# Patient Record
Sex: MALE | Race: WHITE | NOT HISPANIC OR LATINO | Employment: OTHER | URBAN - METROPOLITAN AREA
[De-identification: names, ages, dates, MRNs, and addresses within clinical notes are randomized per-mention and may not be internally consistent; named-entity substitution may affect disease eponyms.]

---

## 2018-05-15 ENCOUNTER — EST. PATIENT EMERGENCY (OUTPATIENT)
Dept: URBAN - METROPOLITAN AREA CLINIC 43 | Facility: CLINIC | Age: 73
End: 2018-05-15

## 2018-05-15 DIAGNOSIS — H04.123: ICD-10-CM

## 2018-05-15 PROCEDURE — 1036F TOBACCO NON-USER: CPT

## 2018-05-15 PROCEDURE — 92012 INTRM OPH EXAM EST PATIENT: CPT

## 2018-05-15 PROCEDURE — G8427 DOCREV CUR MEDS BY ELIG CLIN: HCPCS

## 2018-05-15 ASSESSMENT — VISUAL ACUITY
OD_PH: 20/40-2
OS_SC: 20/50-2
OD_SC: 20/50-2

## 2018-05-15 ASSESSMENT — TONOMETRY: OD_IOP_MMHG: 18

## 2018-08-02 ENCOUNTER — ESTABLISHED COMPREHENSIVE EXAM (OUTPATIENT)
Dept: URBAN - METROPOLITAN AREA CLINIC 43 | Facility: CLINIC | Age: 73
End: 2018-08-02

## 2018-08-02 DIAGNOSIS — H18.51: ICD-10-CM

## 2018-08-02 DIAGNOSIS — H25.813: ICD-10-CM

## 2018-08-02 DIAGNOSIS — H04.123: ICD-10-CM

## 2018-08-02 DIAGNOSIS — H40.1130: ICD-10-CM

## 2018-08-02 PROCEDURE — G8427 DOCREV CUR MEDS BY ELIG CLIN: HCPCS

## 2018-08-02 PROCEDURE — 3285F IOP DOWN <15% OF PRE-SVC LVL: CPT

## 2018-08-02 PROCEDURE — 92250 FUNDUS PHOTOGRAPHY W/I&R: CPT

## 2018-08-02 PROCEDURE — 1036F TOBACCO NON-USER: CPT

## 2018-08-02 PROCEDURE — 2027F OPTIC NERVE HEAD EVAL DONE: CPT

## 2018-08-02 PROCEDURE — 92014 COMPRE OPH EXAM EST PT 1/>: CPT

## 2018-08-02 PROCEDURE — G8756 NO BP MEASURE DOC: HCPCS

## 2018-08-02 PROCEDURE — 0517F GLAUCOMA PLAN OF CARE DOCD: CPT

## 2018-08-02 ASSESSMENT — TONOMETRY
OS_IOP_MMHG: 16
OD_IOP_MMHG: 17

## 2018-08-02 ASSESSMENT — VISUAL ACUITY
OS_PH: 20/25
OS_SC: J1
OS_SC: 20/40-2
OD_SC: 20/30-2
OD_SC: J8

## 2019-08-01 ENCOUNTER — ESTABLISHED COMPREHENSIVE EXAM (OUTPATIENT)
Dept: URBAN - METROPOLITAN AREA CLINIC 43 | Facility: CLINIC | Age: 74
End: 2019-08-01

## 2019-08-01 DIAGNOSIS — H40.1131: ICD-10-CM

## 2019-08-01 DIAGNOSIS — H18.51: ICD-10-CM

## 2019-08-01 DIAGNOSIS — H25.813: ICD-10-CM

## 2019-08-01 DIAGNOSIS — H04.123: ICD-10-CM

## 2019-08-01 PROCEDURE — 92133 CPTRZD OPH DX IMG PST SGM ON: CPT

## 2019-08-01 PROCEDURE — 92014 COMPRE OPH EXAM EST PT 1/>: CPT

## 2019-08-01 ASSESSMENT — VISUAL ACUITY
OD_SC: J12
OS_SC: 20/40
OS_SC: J1
OD_SC: 20/40+1

## 2019-08-01 ASSESSMENT — TONOMETRY
OD_IOP_MMHG: 18
OS_IOP_MMHG: 17

## 2020-08-06 ENCOUNTER — ESTABLISHED COMPREHENSIVE EXAM (OUTPATIENT)
Dept: URBAN - METROPOLITAN AREA CLINIC 43 | Facility: CLINIC | Age: 75
End: 2020-08-06

## 2020-08-06 DIAGNOSIS — H40.1131: ICD-10-CM

## 2020-08-06 PROCEDURE — 92250 FUNDUS PHOTOGRAPHY W/I&R: CPT

## 2020-08-06 PROCEDURE — 92083 EXTENDED VISUAL FIELD XM: CPT

## 2020-08-06 PROCEDURE — 92014 COMPRE OPH EXAM EST PT 1/>: CPT

## 2020-08-06 ASSESSMENT — VISUAL ACUITY
OD_SC: 20/30-2
OD_SC: J10
OS_SC: 20/30-2
OS_SC: J2

## 2020-08-06 ASSESSMENT — TONOMETRY
OS_IOP_MMHG: 18
OD_IOP_MMHG: 18

## 2021-10-05 ENCOUNTER — ESTABLISHED COMPREHENSIVE EXAM (OUTPATIENT)
Dept: URBAN - METROPOLITAN AREA CLINIC 43 | Facility: CLINIC | Age: 76
End: 2021-10-05

## 2021-10-05 DIAGNOSIS — H40.1131: ICD-10-CM

## 2021-10-05 DIAGNOSIS — H04.123: ICD-10-CM

## 2021-10-05 DIAGNOSIS — H25.813: ICD-10-CM

## 2021-10-05 DIAGNOSIS — H18.513: ICD-10-CM

## 2021-10-05 PROCEDURE — 92014 COMPRE OPH EXAM EST PT 1/>: CPT

## 2021-10-05 PROCEDURE — 92202 OPSCPY EXTND ON/MAC DRAW: CPT

## 2021-10-05 PROCEDURE — 92133 CPTRZD OPH DX IMG PST SGM ON: CPT

## 2021-10-05 ASSESSMENT — TONOMETRY
OS_IOP_MMHG: 16
OD_IOP_MMHG: 16

## 2021-10-05 ASSESSMENT — VISUAL ACUITY
OD_SC: J12
OS_SC: 20/40-2
OS_SC: J2
OD_SC: 20/40-1

## 2021-10-11 NOTE — PATIENT DISCUSSION
Discussed Pt's previous glaucoma hx. Discussed Severe and Mild Glaucoma and importance of monitoring closely with appts and testing and continued drop compliance to protect from further nerve damage and permanent vision loss.

## 2021-10-11 NOTE — PATIENT DISCUSSION
Continue Timolol 0.5% bid OD and Xelpros qhs OD. [employer] E-Rx sent to [facility] today.

## 2022-02-24 NOTE — PATIENT DISCUSSION
Discussed option of Minor Sx to drain if worsens or does not improve. Schedule Incision and Drainage in Minor Sx if Pt calls with worsening or no improvement.

## 2022-02-24 NOTE — PATIENT DISCUSSION
Pt bothered by lashes. Epilated lashes from [facility] with jewelers at slit lamp today. No complications. Pt gave verbal consent.

## 2022-02-24 NOTE — PROCEDURE NOTE: CLINICAL
PROCEDURE NOTE: Epilation Right Upper Lid. Diagnosis: Trichiasis without Entropion. Anesthesia: Topical. Prior to treatment, risks/benefits/alternatives discussed including infection, loss of vision, hemorrhage, cataract, glaucoma, retinal tears or detachment. Aberrant lashes removed from RUL using microforcep. Patient tolerated procedure well. There were no complications. Post-op instructions given. Rachel Wooten

## 2022-07-21 NOTE — PATIENT DISCUSSION
Pt to continue following with Dr. Olinda Wang as scheduled and call here for an appt if IOP elevates or not improved with addition of Timolol OS.

## 2022-07-21 NOTE — PATIENT DISCUSSION
Recommended adding drop to OS reduce and control IOP OS to protect from further optic nerve damage and permanent vision loss.

## 2022-10-27 ENCOUNTER — COMPREHENSIVE EXAM (OUTPATIENT)
Dept: URBAN - METROPOLITAN AREA CLINIC 43 | Facility: CLINIC | Age: 77
End: 2022-10-27

## 2022-10-27 DIAGNOSIS — H40.1131: ICD-10-CM

## 2022-10-27 PROCEDURE — 92250 FUNDUS PHOTOGRAPHY W/I&R: CPT

## 2022-10-27 PROCEDURE — 92014 COMPRE OPH EXAM EST PT 1/>: CPT

## 2022-10-27 ASSESSMENT — VISUAL ACUITY
OD_SC: 20/40+1
OD_SC: J12
OS_SC: 20/40-1
OS_SC: J1

## 2022-10-27 ASSESSMENT — TONOMETRY
OD_IOP_MMHG: 20
OS_IOP_MMHG: 20

## 2023-01-20 NOTE — PATIENT DISCUSSION
Pt bothered by lashes. Epilated lashes from [facility] with jewelers at slit lamp today. No complications. Pt gave verbal consent.

## 2023-01-20 NOTE — PROCEDURE NOTE: CLINICAL
PROCEDURE NOTE: Epilation Right Upper Lid. Diagnosis: Trichiasis without Entropion. Anesthesia: Topical. Prior to treatment, risks/benefits/alternatives discussed including infection, . Aberrant lashes removed from * lid(s) using microforcep. Patient tolerated procedure well. There were no complications. Post-op instructions given. Francisca Raman

## 2023-11-02 ENCOUNTER — COMPREHENSIVE EXAM (OUTPATIENT)
Dept: URBAN - METROPOLITAN AREA CLINIC 43 | Facility: CLINIC | Age: 78
End: 2023-11-02

## 2023-11-02 DIAGNOSIS — H40.1131: ICD-10-CM

## 2023-11-02 PROCEDURE — 92202 OPSCPY EXTND ON/MAC DRAW: CPT

## 2023-11-02 PROCEDURE — 92133 CPTRZD OPH DX IMG PST SGM ON: CPT

## 2023-11-02 PROCEDURE — 92014 COMPRE OPH EXAM EST PT 1/>: CPT

## 2023-11-02 ASSESSMENT — VISUAL ACUITY
OD_SC: J5
OS_SC: 20/30
OS_SC: J1
OD_SC: 20/25

## 2023-11-02 ASSESSMENT — TONOMETRY
OS_IOP_MMHG: 17
OD_IOP_MMHG: 18

## 2024-12-19 ENCOUNTER — COMPREHENSIVE EXAM (OUTPATIENT)
Age: 79
End: 2024-12-19

## 2024-12-19 DIAGNOSIS — H40.1131: ICD-10-CM

## 2024-12-19 PROCEDURE — 92083 EXTENDED VISUAL FIELD XM: CPT

## 2024-12-19 PROCEDURE — 92014 COMPRE OPH EXAM EST PT 1/>: CPT

## 2024-12-19 PROCEDURE — 92250 FUNDUS PHOTOGRAPHY W/I&R: CPT
